# Patient Record
Sex: MALE | Race: BLACK OR AFRICAN AMERICAN | NOT HISPANIC OR LATINO | URBAN - METROPOLITAN AREA
[De-identification: names, ages, dates, MRNs, and addresses within clinical notes are randomized per-mention and may not be internally consistent; named-entity substitution may affect disease eponyms.]

---

## 2018-03-02 ENCOUNTER — APPOINTMENT (RX ONLY)
Dept: URBAN - METROPOLITAN AREA OTHER 10 | Facility: OTHER | Age: 23
Setting detail: DERMATOLOGY
End: 2018-03-02

## 2018-03-02 DIAGNOSIS — L91.0 HYPERTROPHIC SCAR: ICD-10-CM

## 2018-03-02 DIAGNOSIS — L73.0 ACNE KELOID: ICD-10-CM

## 2018-03-02 PROCEDURE — 99213 OFFICE O/P EST LOW 20 MIN: CPT | Mod: 25

## 2018-03-02 PROCEDURE — ? INTRALESIONAL KENALOG

## 2018-03-02 PROCEDURE — ? COUNSELING

## 2018-03-02 PROCEDURE — ? PRESCRIPTION

## 2018-03-02 PROCEDURE — ? TREATMENT REGIMEN

## 2018-03-02 PROCEDURE — 11901 INJECT SKIN LESIONS >7: CPT

## 2018-03-02 RX ORDER — FLUOCINONIDE 1 MG/G
CREAM TOPICAL QD
Qty: 60 | Refills: 1 | Status: ERX | COMMUNITY
Start: 2018-03-02

## 2018-03-02 RX ORDER — TAZAROTENE 1 MG/G
CREAM TOPICAL QD
Qty: 1 | Refills: 1 | Status: ERX | COMMUNITY
Start: 2018-03-02

## 2018-03-02 RX ORDER — DOXYCYCLINE HYCLATE 150 MG/1
1 TABLET, FILM COATED ORAL
Qty: 30 | Refills: 1 | Status: ERX | COMMUNITY
Start: 2018-03-02

## 2018-03-02 RX ADMIN — FLUOCINONIDE: 1 CREAM TOPICAL at 00:00

## 2018-03-02 RX ADMIN — DOXYCYCLINE HYCLATE 1: 150 TABLET, FILM COATED ORAL at 00:00

## 2018-03-02 RX ADMIN — TAZAROTENE: 1 CREAM TOPICAL at 00:00

## 2018-03-02 ASSESSMENT — LOCATION DETAILED DESCRIPTION DERM
LOCATION DETAILED: LEFT SUPERIOR OCCIPITAL SCALP
LOCATION DETAILED: RIGHT INFERIOR OCCIPITAL SCALP
LOCATION DETAILED: MID-OCCIPITAL SCALP
LOCATION DETAILED: LEFT INFERIOR OCCIPITAL SCALP

## 2018-03-02 ASSESSMENT — LOCATION SIMPLE DESCRIPTION DERM: LOCATION SIMPLE: POSTERIOR SCALP

## 2018-03-02 ASSESSMENT — LOCATION ZONE DERM: LOCATION ZONE: SCALP

## 2018-03-02 NOTE — PROCEDURE: INTRALESIONAL KENALOG
Concentration Of Kenalog Solution Injected (Mg/Ml): 20.0
Administered By (Optional): Brando Asif
Lot # For Kenalog (Optional): [id number]
Consent: The risks of atrophy were reviewed with the patient.
Expiration Date For Kenalog (Optional): 11/2018
Detail Level: Detailed
X Size Of Lesion In Cm (Optional): 0
Total Volume (Ccs): 1.7
Ndc# For Kenalog Only: 8887-1092-39
Include Z78.9 (Other Specified Conditions Influencing Health Status) As An Associated Diagnosis?: No
Kenalog Preparation: Kenalog
Medical Necessity Clause: This procedure was medically necessary because the lesions that were treated were:

## 2018-03-02 NOTE — PROCEDURE: TREATMENT REGIMEN
Detail Level: Zone
Initiate Treatment: Fluocinonide twice a day every other day\\nTazoratene every night \\nDoxycycline once daily as needed for 1 week when flared

## 2018-03-02 NOTE — HPI: SKIN LESIONS
How Severe Is Your Skin Lesion?: severe
Have Your Skin Lesions Been Treated?: been treated
Is This A New Presentation, Or A Follow-Up?: Skin Lesions
Additional History: \\n\\nReturning patient is here today for a focused visit with concerns of possible folliculitis on the posterior scalp. The patient has been treated in the past but did not follow up.

## 2019-07-15 ENCOUNTER — APPOINTMENT (RX ONLY)
Dept: URBAN - METROPOLITAN AREA OTHER 10 | Facility: OTHER | Age: 24
Setting detail: DERMATOLOGY
End: 2019-07-15

## 2019-07-15 DIAGNOSIS — L91.0 HYPERTROPHIC SCAR: ICD-10-CM | Status: WORSENING

## 2019-07-15 DIAGNOSIS — L73.0 ACNE KELOID: ICD-10-CM | Status: WORSENING

## 2019-07-15 PROCEDURE — ? PRESCRIPTION

## 2019-07-15 PROCEDURE — ? COUNSELING

## 2019-07-15 PROCEDURE — 99213 OFFICE O/P EST LOW 20 MIN: CPT | Mod: 25

## 2019-07-15 PROCEDURE — ? TREATMENT REGIMEN

## 2019-07-15 PROCEDURE — 11901 INJECT SKIN LESIONS >7: CPT

## 2019-07-15 PROCEDURE — ? INTRALESIONAL KENALOG

## 2019-07-15 RX ORDER — DOXYCYCLINE HYCLATE 150 MG/1
TABLET, FILM COATED ORAL
Qty: 30 | Refills: 1 | Status: ERX

## 2019-07-15 RX ORDER — HALOBETASOL PROPIONATE 0.5 MG/G
AEROSOL, FOAM TOPICAL
Qty: 1 | Refills: 1 | Status: ERX | COMMUNITY
Start: 2019-07-15

## 2019-07-15 RX ADMIN — HALOBETASOL PROPIONATE: 0.5 AEROSOL, FOAM TOPICAL at 18:48

## 2019-07-15 ASSESSMENT — LOCATION SIMPLE DESCRIPTION DERM
LOCATION SIMPLE: POSTERIOR NECK
LOCATION SIMPLE: POSTERIOR SCALP

## 2019-07-15 ASSESSMENT — LOCATION DETAILED DESCRIPTION DERM
LOCATION DETAILED: MID POSTERIOR NECK
LOCATION DETAILED: MID-OCCIPITAL SCALP
LOCATION DETAILED: LEFT SUPERIOR POSTERIOR NECK
LOCATION DETAILED: RIGHT SUPERIOR POSTERIOR NECK
LOCATION DETAILED: LEFT INFERIOR OCCIPITAL SCALP

## 2019-07-15 ASSESSMENT — LOCATION ZONE DERM
LOCATION ZONE: SCALP
LOCATION ZONE: NECK

## 2019-07-15 NOTE — PROCEDURE: INTRALESIONAL KENALOG
Total Volume (Ccs): 1.8
Include Z78.9 (Other Specified Conditions Influencing Health Status) As An Associated Diagnosis?: No
Administered By (Optional): Haydee Lake PA-C
Concentration Of Kenalog Solution Injected (Mg/Ml): 20.0
X Size Of Lesion In Cm (Optional): 0
Ndc# For Kenalog Only: 330029-6011
Detail Level: Detailed
Kenalog Preparation: Kenalog
Medical Necessity Clause: This procedure was medically necessary because the lesions that were treated were:
Expiration Date For Kenalog (Optional): 2/21
Lot # For Kenalog (Optional): [id number]
Consent: The risks of atrophy were reviewed with the patient.

## 2019-07-15 NOTE — PROCEDURE: TREATMENT REGIMEN
Detail Level: Simple
Initiate Treatment: Doxycycline QD x2 months \\nHalobetasol QD x3 weeks (MON-FRI ONLY)

## 2024-11-13 ENCOUNTER — APPOINTMENT (RX ONLY)
Dept: URBAN - METROPOLITAN AREA OTHER 10 | Facility: OTHER | Age: 29
Setting detail: DERMATOLOGY
End: 2024-11-13

## 2024-11-13 DIAGNOSIS — L73.0 ACNE KELOID: ICD-10-CM

## 2024-11-13 DIAGNOSIS — L73.1 PSEUDOFOLLICULITIS BARBAE: ICD-10-CM | Status: INADEQUATELY CONTROLLED

## 2024-11-13 PROCEDURE — 11900 INJECT SKIN LESIONS </W 7: CPT

## 2024-11-13 PROCEDURE — 99204 OFFICE O/P NEW MOD 45 MIN: CPT | Mod: 25

## 2024-11-13 PROCEDURE — ? COUNSELING

## 2024-11-13 PROCEDURE — ? PRESCRIPTION

## 2024-11-13 PROCEDURE — ? INTRALESIONAL KENALOG

## 2024-11-13 PROCEDURE — ? PHOTO-DOCUMENTATION

## 2024-11-13 PROCEDURE — ? PRESCRIPTION MEDICATION MANAGEMENT

## 2024-11-13 RX ORDER — DOXYCYCLINE HYCLATE 100 MG/1
CAPSULE, GELATIN COATED ORAL
Qty: 60 | Refills: 2 | Status: ACTIVE

## 2024-11-13 RX ADMIN — DOXYCYCLINE HYCLATE: 100 CAPSULE, GELATIN COATED ORAL at 00:00

## 2024-11-13 ASSESSMENT — LOCATION DETAILED DESCRIPTION DERM
LOCATION DETAILED: MID-OCCIPITAL SCALP
LOCATION DETAILED: RIGHT INFERIOR OCCIPITAL SCALP
LOCATION DETAILED: RIGHT OCCIPITAL SCALP
LOCATION DETAILED: LEFT INFERIOR OCCIPITAL SCALP

## 2024-11-13 ASSESSMENT — SEVERITY ASSESSMENT: SEVERITY: MILD

## 2024-11-13 ASSESSMENT — LOCATION SIMPLE DESCRIPTION DERM: LOCATION SIMPLE: POSTERIOR SCALP

## 2024-11-13 ASSESSMENT — LOCATION ZONE DERM: LOCATION ZONE: SCALP

## 2024-11-13 NOTE — PROCEDURE: INTRALESIONAL KENALOG
Consent: The risks of atrophy were reviewed with the patient.
Expiration Date For Kenalog (Optional): April 2026
Lot # For Kenalog (Optional): [id number]
How Many Mls Were Removed From The 40 Mg/Ml (5ml) Vial When Preparing The Injectable Solution?: 0
Medical Necessity Clause: This procedure was medically necessary because the lesions that were treated were:
Require Ndc Code?: No
Which Kenalog Vial Was Used?: Kenalog 10 mg/ml (5 ml vial)
Ndc# For Kenalog Only: 3558-3081-56
Administered By (Optional): Brando Asif
Kenalog Preparation: Kenalog
Kenalog Type Of Vial: Multiple Dose
Concentration Of Kenalog Solution Injected (Mg/Ml): 5.0
Total Volume (Ccs): 1.0
How Many Mls Were Removed From The 10 Mg/Ml (5ml) Vial When Preparing The Injectable Solution?: 1
Validate Note Data When Using Inventory: Yes
Detail Level: Detailed

## 2024-11-13 NOTE — PROCEDURE: PRESCRIPTION MEDICATION MANAGEMENT
Initiate Treatment: Doxycycline 100mg- Take one tablet by mouth twice daily with food and water. Wait one hour before lying down.
Render In Strict Bullet Format?: No
Detail Level: Zone
Plan: Pt will f/u in 1 month.
Initiate Treatment: Doxycycline PRN